# Patient Record
Sex: FEMALE | Race: WHITE
[De-identification: names, ages, dates, MRNs, and addresses within clinical notes are randomized per-mention and may not be internally consistent; named-entity substitution may affect disease eponyms.]

---

## 2018-07-27 ENCOUNTER — APPOINTMENT (OUTPATIENT)
Dept: ORTHOPEDIC SURGERY | Facility: CLINIC | Age: 38
End: 2018-07-27
Payer: MEDICAID

## 2018-07-27 VITALS
SYSTOLIC BLOOD PRESSURE: 126 MMHG | HEIGHT: 67 IN | DIASTOLIC BLOOD PRESSURE: 80 MMHG | BODY MASS INDEX: 25.9 KG/M2 | WEIGHT: 165 LBS | HEART RATE: 77 BPM

## 2018-07-27 DIAGNOSIS — Z86.69 PERSONAL HISTORY OF OTHER DISEASES OF THE NERVOUS SYSTEM AND SENSE ORGANS: ICD-10-CM

## 2018-07-27 DIAGNOSIS — Z78.9 OTHER SPECIFIED HEALTH STATUS: ICD-10-CM

## 2018-07-27 DIAGNOSIS — Z87.39 PERSONAL HISTORY OF OTHER DISEASES OF THE MUSCULOSKELETAL SYSTEM AND CONNECTIVE TISSUE: ICD-10-CM

## 2018-07-27 DIAGNOSIS — Z82.61 FAMILY HISTORY OF ARTHRITIS: ICD-10-CM

## 2018-07-27 DIAGNOSIS — Z80.9 FAMILY HISTORY OF MALIGNANT NEOPLASM, UNSPECIFIED: ICD-10-CM

## 2018-07-27 DIAGNOSIS — M17.11 UNILATERAL PRIMARY OSTEOARTHRITIS, RIGHT KNEE: ICD-10-CM

## 2018-07-27 DIAGNOSIS — M22.2X1 PATELLOFEMORAL DISORDERS, RIGHT KNEE: ICD-10-CM

## 2018-07-27 PROCEDURE — 99204 OFFICE O/P NEW MOD 45 MIN: CPT | Mod: 25

## 2018-07-27 PROCEDURE — 20610 DRAIN/INJ JOINT/BURSA W/O US: CPT | Mod: RT

## 2018-07-27 RX ORDER — IRON/IRON ASP GLY/FA/MV-MIN 38 125-25-1MG
TABLET ORAL
Refills: 0 | Status: ACTIVE | COMMUNITY

## 2018-07-27 RX ORDER — TURMERIC ROOT EXTRACT 500 MG
TABLET ORAL
Refills: 0 | Status: ACTIVE | COMMUNITY

## 2018-07-27 RX ORDER — DIMETHYL FUMARATE 120-240 MG
KIT ORAL
Refills: 0 | Status: ACTIVE | COMMUNITY

## 2018-07-27 RX ORDER — CHROMIUM 200 MCG
TABLET ORAL
Refills: 0 | Status: ACTIVE | COMMUNITY

## 2018-11-29 ENCOUNTER — INPATIENT (INPATIENT)
Facility: HOSPITAL | Age: 38
LOS: 1 days | Discharge: HOME | End: 2018-12-01
Attending: HOSPITALIST | Admitting: HOSPITALIST
Payer: MEDICARE

## 2018-11-29 VITALS
TEMPERATURE: 98 F | HEIGHT: 66 IN | SYSTOLIC BLOOD PRESSURE: 150 MMHG | RESPIRATION RATE: 22 BRPM | HEART RATE: 99 BPM | OXYGEN SATURATION: 100 % | DIASTOLIC BLOOD PRESSURE: 68 MMHG | WEIGHT: 160.06 LBS

## 2018-11-29 LAB
ALBUMIN SERPL ELPH-MCNC: 4.6 G/DL — SIGNIFICANT CHANGE UP (ref 3.3–5)
ALP SERPL-CCNC: 60 U/L — SIGNIFICANT CHANGE UP (ref 40–120)
ALT FLD-CCNC: 24 U/L — SIGNIFICANT CHANGE UP (ref 12–78)
ANION GAP SERPL CALC-SCNC: 12 MMOL/L — SIGNIFICANT CHANGE UP (ref 5–17)
APTT BLD: 31.1 SEC — SIGNIFICANT CHANGE UP (ref 27.5–36.3)
AST SERPL-CCNC: 14 U/L — LOW (ref 15–37)
BASOPHILS # BLD AUTO: 0.06 K/UL — SIGNIFICANT CHANGE UP (ref 0–0.2)
BASOPHILS NFR BLD AUTO: 0.8 % — SIGNIFICANT CHANGE UP (ref 0–2)
BILIRUB SERPL-MCNC: 0.5 MG/DL — SIGNIFICANT CHANGE UP (ref 0.2–1.2)
BUN SERPL-MCNC: 17 MG/DL — SIGNIFICANT CHANGE UP (ref 7–23)
CALCIUM SERPL-MCNC: 9.5 MG/DL — SIGNIFICANT CHANGE UP (ref 8.5–10.1)
CHLORIDE SERPL-SCNC: 108 MMOL/L — SIGNIFICANT CHANGE UP (ref 96–108)
CO2 SERPL-SCNC: 20 MMOL/L — LOW (ref 22–31)
CREAT SERPL-MCNC: 0.69 MG/DL — SIGNIFICANT CHANGE UP (ref 0.5–1.3)
EOSINOPHIL # BLD AUTO: 0.06 K/UL — SIGNIFICANT CHANGE UP (ref 0–0.5)
EOSINOPHIL NFR BLD AUTO: 0.8 % — SIGNIFICANT CHANGE UP (ref 0–6)
GLUCOSE SERPL-MCNC: 102 MG/DL — HIGH (ref 70–99)
HCG SERPL-ACNC: <1 MIU/ML — SIGNIFICANT CHANGE UP
HCT VFR BLD CALC: 30.2 % — LOW (ref 34.5–45)
HGB BLD-MCNC: 9.9 G/DL — LOW (ref 11.5–15.5)
IMM GRANULOCYTES NFR BLD AUTO: 0.1 % — SIGNIFICANT CHANGE UP (ref 0–1.5)
INR BLD: 1.02 RATIO — SIGNIFICANT CHANGE UP (ref 0.88–1.16)
LYMPHOCYTES # BLD AUTO: 2.21 K/UL — SIGNIFICANT CHANGE UP (ref 1–3.3)
LYMPHOCYTES # BLD AUTO: 28 % — SIGNIFICANT CHANGE UP (ref 13–44)
MCHC RBC-ENTMCNC: 20 PG — LOW (ref 27–34)
MCHC RBC-ENTMCNC: 32.8 GM/DL — SIGNIFICANT CHANGE UP (ref 32–36)
MCV RBC AUTO: 61 FL — LOW (ref 80–100)
MONOCYTES # BLD AUTO: 0.5 K/UL — SIGNIFICANT CHANGE UP (ref 0–0.9)
MONOCYTES NFR BLD AUTO: 6.3 % — SIGNIFICANT CHANGE UP (ref 2–14)
NEUTROPHILS # BLD AUTO: 5.06 K/UL — SIGNIFICANT CHANGE UP (ref 1.8–7.4)
NEUTROPHILS NFR BLD AUTO: 64 % — SIGNIFICANT CHANGE UP (ref 43–77)
NRBC # BLD: 0 /100 WBCS — SIGNIFICANT CHANGE UP (ref 0–0)
PLATELET # BLD AUTO: 232 K/UL — SIGNIFICANT CHANGE UP (ref 150–400)
POTASSIUM SERPL-MCNC: 3.6 MMOL/L — SIGNIFICANT CHANGE UP (ref 3.5–5.3)
POTASSIUM SERPL-SCNC: 3.6 MMOL/L — SIGNIFICANT CHANGE UP (ref 3.5–5.3)
PROT SERPL-MCNC: 7.4 GM/DL — SIGNIFICANT CHANGE UP (ref 6–8.3)
PROTHROM AB SERPL-ACNC: 11.3 SEC — SIGNIFICANT CHANGE UP (ref 10–12.9)
RBC # BLD: 4.95 M/UL — SIGNIFICANT CHANGE UP (ref 3.8–5.2)
RBC # FLD: 15.3 % — HIGH (ref 10.3–14.5)
SODIUM SERPL-SCNC: 140 MMOL/L — SIGNIFICANT CHANGE UP (ref 135–145)
WBC # BLD: 7.9 K/UL — SIGNIFICANT CHANGE UP (ref 3.8–10.5)
WBC # FLD AUTO: 7.9 K/UL — SIGNIFICANT CHANGE UP (ref 3.8–10.5)

## 2018-11-29 PROCEDURE — 70450 CT HEAD/BRAIN W/O DYE: CPT | Mod: 26

## 2018-11-29 PROCEDURE — 71045 X-RAY EXAM CHEST 1 VIEW: CPT | Mod: 26

## 2018-11-29 PROCEDURE — 93010 ELECTROCARDIOGRAM REPORT: CPT

## 2018-11-29 PROCEDURE — 99285 EMERGENCY DEPT VISIT HI MDM: CPT

## 2018-11-29 RX ORDER — DEXAMETHASONE 0.5 MG/5ML
10 ELIXIR ORAL ONCE
Qty: 0 | Refills: 0 | Status: COMPLETED | OUTPATIENT
Start: 2018-11-29 | End: 2018-11-29

## 2018-11-29 RX ORDER — ASPIRIN/CALCIUM CARB/MAGNESIUM 324 MG
325 TABLET ORAL ONCE
Qty: 0 | Refills: 0 | Status: COMPLETED | OUTPATIENT
Start: 2018-11-29 | End: 2018-11-29

## 2018-11-29 RX ORDER — SODIUM CHLORIDE 9 MG/ML
1000 INJECTION INTRAMUSCULAR; INTRAVENOUS; SUBCUTANEOUS ONCE
Qty: 0 | Refills: 0 | Status: COMPLETED | OUTPATIENT
Start: 2018-11-29 | End: 2018-11-29

## 2018-11-29 RX ORDER — DEXAMETHASONE 0.5 MG/5ML
10 ELIXIR ORAL ONCE
Qty: 0 | Refills: 0 | Status: DISCONTINUED | OUTPATIENT
Start: 2018-11-29 | End: 2018-11-29

## 2018-11-29 RX ADMIN — Medication 102 MILLIGRAM(S): at 19:45

## 2018-11-29 RX ADMIN — SODIUM CHLORIDE 1000 MILLILITER(S): 9 INJECTION INTRAMUSCULAR; INTRAVENOUS; SUBCUTANEOUS at 19:04

## 2018-11-29 RX ADMIN — SODIUM CHLORIDE 1000 MILLILITER(S): 9 INJECTION INTRAMUSCULAR; INTRAVENOUS; SUBCUTANEOUS at 21:00

## 2018-11-29 RX ADMIN — Medication 325 MILLIGRAM(S): at 21:15

## 2018-11-29 RX ADMIN — Medication 10 MILLIGRAM(S): at 20:15

## 2018-11-29 NOTE — H&P ADULT - PMH
Anemia, unspecified type    Irritable bowel syndrome with both constipation and diarrhea    Kidney stones    Migraines    MS (multiple sclerosis) Anemia in other chronic diseases classified elsewhere  beta-thal minor  Beta thalassemia minor    Irritable bowel syndrome with both constipation and diarrhea    Kidney stones    Migraines    MS (multiple sclerosis)

## 2018-11-29 NOTE — ED ADULT TRIAGE NOTE - CHIEF COMPLAINT QUOTE
acute onset of dizziness at 430pm, numbness down 1 arm. hx of MS. evaluated by dr oleary at triage and pt is not a code stroke

## 2018-11-29 NOTE — H&P ADULT - NSHPSOCIALHISTORY_GEN_ALL_CORE
Lives at home with  and two daughters. She is on disability and is unemployed. Never cigarette smoker, no vaping. Occasionally smokes marijuana. Rarely drinks alcohol.

## 2018-11-29 NOTE — ED PROVIDER NOTE - NS_ ATTENDINGSCRIBEDETAILS _ED_A_ED_FT
I, Jori Mark MD,  performed the initial face to face bedside interview with this patient regarding history of present illness, review of symptoms and relevant past medical, social and family history.  I completed an independent physical examination.    The history, relevant review of systems, past medical and surgical history, medical decision making, and physical examination was documented by the scribe in my presence and I attest to the accuracy of the documentation.

## 2018-11-29 NOTE — ED ADULT NURSE NOTE - OBJECTIVE STATEMENT
pt presents to ED tearful/frightened 2* numbness to left arm/leg. Pt dx w/ MS 3yrs prior. Pt has not had symptoms since then.

## 2018-11-29 NOTE — H&P ADULT - NSHPPHYSICALEXAM_GEN_ALL_CORE
Vitals  T(F): 98 (11-29-18 @ 22:04), Max: 98 (11-29-18 @ 18:36)  HR: 70 (11-29-18 @ 22:04) (70 - 99)  BP: 115/55 (11-29-18 @ 22:04) (115/55 - 150/68)  RR: 12 (11-29-18 @ 22:04) (12 - 22)  SpO2: 100% (11-29-18 @ 22:04) (100% - 100%)    Physical Exam   Gen: NAD, comfortable, well nourished, good hygiene  HENT: atraumatic, hearing intact, no gross abnormalities to ears or nose, no rhinorrhea or nasal polyps, mucous membranes moist without lesions or discharge, neck supple without masses  CV: RRR, nl s1/s2, no M/R/G, chest pressure reproducible with touch  Pulm: nl respiratory effort, CTAB, no wheezes/crackles/rhonchi  Back: mild tenderness in low back bilaterally, no scoliosis, lordosis, or kyphosis  Abd: normoactive bowel sounds in all 4 quadrants, soft, nontender, nondistended, no rebound, no guarding, no masses  Extremities: no pedal edema, pedal pulses palpable   Skin: nl warm and dry, no wounds   Neuro: A&Ox3, answering questions appropriately, PERRL, EOMI but sluggish, face symmetric, sensation equal bilaterally in face, tongue midline, no dysarthria, 4/5 strength in the R upper and lower extremities, 3/5 strength in L upper and lower extremities, sensation to light touch and proprioception intact in upper and lower extremities bilaterally, 2+ biceps reflex in R arm, L arm biceps reflex unable to do due to IV placement, b2+ patellar reflexes bilaterally, 2+ achilles reflex in right foot and 1+ achilles reflex in left foot, nl finger to nose, and nl heel to shin   Pysch: no depression, no SI, no HI Vitals  T(F): 98 (11-29-18 @ 22:04), Max: 98 (11-29-18 @ 18:36)  HR: 70 (11-29-18 @ 22:04) (70 - 99)  BP: 115/55 (11-29-18 @ 22:04) (115/55 - 150/68)  RR: 12 (11-29-18 @ 22:04) (12 - 22)  SpO2: 100% (11-29-18 @ 22:04) (100% - 100%)    Physical Exam   Gen: NAD, comfortable, well nourished, good hygiene  HENT: atraumatic, hearing intact, no gross abnormalities to ears or nose, no rhinorrhea or nasal polyps, mucous membranes moist without lesions or discharge, neck supple without masses  CV: RRR, nl s1/s2, no M/R/G, chest pressure reproducible with touch  Pulm: nl respiratory effort, CTAB, no wheezes/crackles/rhonchi  Back: mild tenderness in low back bilaterally, no scoliosis, lordosis, or kyphosis  Abd: normoactive bowel sounds in all 4 quadrants, soft, nontender, nondistended, no rebound, no guarding, no masses  Extremities: no pedal edema, pedal pulses palpable   Skin: nl warm and dry, no wounds   Neuro: A&Ox3, answering questions appropriately, PERRL, EOMI but sluggish, smile assymetric with left side slightly lower than right, sensation equal bilaterally in face, tongue midline, no dysarthria, 4/5 strength in the R upper and lower extremities, 3/5 strength in L upper and lower extremities, sensation to light touch and proprioception intact in upper and lower extremities bilaterally but sensation to touch greater in right extremties than left extremities, 2+ biceps reflex in R arm, L arm biceps reflex unable to do due to IV placement, b2+ patellar reflexes bilaterally, 2+ achilles reflex in right foot and 1+ achilles reflex in left foot, nl finger to nose, and nl heel to shin   Pysch: no depression, no SI, no HI

## 2018-11-29 NOTE — ED ADULT NURSE NOTE - NSIMPLEMENTINTERV_GEN_ALL_ED
Implemented All Fall Risk Interventions:  [place] to call system. Call bell, personal items and telephone within reach. Instruct patient to call for assistance. Room bathroom lighting operational. Non-slip footwear when patient is off stretcher. Physically safe environment: no spills, clutter or unnecessary equipment. Stretcher in lowest position, wheels locked, appropriate side rails in place. Provide visual cue, wrist band, yellow gown, etc. Monitor gait and stability. Monitor for mental status changes and reorient to person, place, and time. Review medications for side effects contributing to fall risk. Reinforce activity limits and safety measures with patient and family.

## 2018-11-29 NOTE — ED PROVIDER NOTE - OBJECTIVE STATEMENT
37 y/o female with a PMHx of MS presents to the ED c/o hours worth of numbness to her left side. +dizziness. Pt states that she had similar symptoms years ago when she was first diagnosed with MS. States that right now she is still dizzy an is having worsening numbness to her left side. Neurologist: Dr. Sethi. 39 y/o female with a PMHx of MS presents to the ED c/o hours worth of numbness to her left side. Pt states this started at an unknown time earlier this morning. Pt states she was ignoring it but then it got worse and called 911. . +dizziness. Pt states that she had similar symptoms years ago when she was first diagnosed with MS. States that right now she is still dizzy an is having worsening numbness to her left side. Neurologist: Dr. Sethi.

## 2018-11-29 NOTE — ED PROVIDER NOTE - NEUROLOGICAL, MLM
Alert and oriented, no focal deficits, no motor or sensory deficits. +Neurovascularly intact distally, no focal neuro deficits

## 2018-11-29 NOTE — ED ADULT NURSE REASSESSMENT NOTE - NS ED NURSE REASSESS COMMENT FT1
change of shift report received from JORDI Kimble. pt vitals as charted. pt still has weakness to the left arm and leg. pt is A&Ox3, cooperative with exam. pt states that she is feeling better to go home, however has been notified about pending admission to hospital and f/u MRI scheduled for tomorrow. pt is agreeable. will continue to monitor.

## 2018-11-29 NOTE — H&P ADULT - HISTORY OF PRESENT ILLNESS
Pt is a 37 yo F w/ PMHx of MS, IBS, migraines, and anemia presenting with left arm and leg numbness and weakness. Pt was in her usual state of health until yesterday when she started feeling dizzy which got better, but today at 4PM as she was putting up michael decorations started feeling dizzy again. Pt reports visual blurriness, seeing spots, double vision, room spinning, and not being able to open her eyes. She felt as if her heart was pounding and she couldn't breathe. She reports feeling a chest pressure lower retrosternal, but no pain. She denies diaphoresis, nausea, and vomiting. She went to urgent care and while she was there, she started feeling her left arm feel numb and she couldn't move it. She was sent to the ED and the numbness and paralysis spread to her left leg. She denies any slurring of speech, facial asymmetry, syncope, LOC. This is similar to a previous MS flare that she had 3 years ago when she was diagnosed. At that time, it started with dizziness that continued for 2.5 days followed by left arm numbness and weakness. Of note for the last few days, she has been having cold sweats, but no fevers. She denies cough, wheeze, runny nose. 2 weeks ago she had abdominal pain with nausea and vomiting that resolved.     In the ED, she received decadron 10 mg and her weakness has improved. Her dizziness and sob has resolved. However now she feels pain in her left arm and leg as if someone punched them. She currently has a headache across the top of her head that is throbbing, but mild compared to her migraines.     Review of Symptoms  Gen: no fevers, weight loss, fatigue  Visual: see HPI for positives   Cardiovascular: see HPI, no orthopnea, no leg swelling  Respiratory: see HPI, no exertional dyspnea, no cough, no rhinorrhea, no nasal congestion  GI: no difficulty swallowing, no nausea, no vomiting, no abdominal pain, no diarrhea, no constipation, no melana  : no dysuria, no increased freq, no hematuria, no malodorous urine  Derm: no wounds, no rashes  Heme: no easy bleeding or bruising  MSK: no joint pain, no joint swelling or redness, no extremity pain   Neuro: see HPI, no memory loss  Psych: no depression, no anxiety, no SI Pt is a 39 yo F w/ PMHx of MS, IBS, migraines, and anemia presenting with left arm and leg numbness and weakness. Pt was in her usual state of health until yesterday when she started feeling dizzy which got better, but today at 4PM as she was putting up michael decorations started feeling dizzy again. Pt reports visual blurriness, seeing spots, double vision, room spinning, and not being able to open her eyes. She felt as if her heart was pounding and she couldn't breathe. She reports feeling a chest pressure lower retrosternal, but no pain. She denies diaphoresis, nausea, and vomiting. She went to urgent care and while she was there, she started feeling her left arm feel numb and she couldn't move it. She was sent to the ED and the numbness and paralysis spread to her left leg. She denies any slurring of speech, facial asymmetry, syncope, LOC. This is similar to a previous MS flare that she had 3 years ago when she was diagnosed. At that time, it started with dizziness that continued for 2.5 days followed by left arm numbness and weakness. She has no residual deficits from her last flare. Of note for the last few days, she has been having cold sweats, but no fevers. She denies cough, wheeze, runny nose. 2 weeks ago she had abdominal pain with nausea and vomiting that resolved.     In the ED, she received decadron 10 mg and her weakness has improved. Her dizziness and sob has resolved. However now she feels pain in her left arm and leg as if someone punched them. She currently has a headache across the top of her head that is throbbing, but mild compared to her migraines.     Review of Symptoms  Gen: no fevers, weight loss, fatigue  Visual: see HPI for positives   Cardiovascular: see HPI, no orthopnea, no leg swelling  Respiratory: see HPI, no exertional dyspnea, no cough, no rhinorrhea, no nasal congestion  GI: no difficulty swallowing, no nausea, no vomiting, no abdominal pain, no diarrhea, no constipation, no melana  : no dysuria, no increased freq, no hematuria, no malodorous urine  Derm: no wounds, no rashes  Heme: no easy bleeding or bruising  MSK: no joint pain, no joint swelling or redness, no extremity pain   Neuro: see HPI, no memory loss  Psych: no depression, no anxiety, no SI

## 2018-11-29 NOTE — H&P ADULT - ASSESSMENT
39 yo F w/ PMHx of MS, IBS, and anemia presenting with left arm and leg weakness and numbness likely an MS flare.     #MS flare vs CVA  - most likely MS flare as is similar to her previous MS flare with improvement of symptoms after steroids  - admit to med   - s/p dexamethasone 10 mg IV  - solumedrol 500 mg IV once daily   - neuro consult   - MRI brain  - regular diet    #Anemia  - microcytic anemia possibly iron deficiency or thalessemia given pt's mediterranean ancestry   - iron studies     #DVT ppx  - SCDs  IMPROVE VTE Individual Risk Assessment  RISK                                                                Points  [  ] Previous VTE                                                  3  [  ] Thrombophilia                                               2  [X] Lower limb paralysis                                      2        (unable to hold up >15 seconds)    [  ] Current Cancer                                              2         (within 6 months)  [  ] Immobilization > 24 hrs                                1  [  ] ICU/CCU stay > 24 hours                              1  [  ] Age > 60                                                      1  IMPROVE VTE Score 2 37 yo F w/ PMHx of MS, IBS, and anemia presenting with left arm and leg weakness and numbness likely an MS flare.     #MS flare vs CVA  - most likely MS flare as is similar to her previous MS flare with improvement of symptoms after steroids  - admit to med   - s/p dexamethasone 10 mg IV  - solumedrol 1000 mg IV once daily   - neuro consult   - MRI brain w/ & w/o contrast  - lipid panel  - A1c  - regular diet    #Beta-thalessemia minor anemia  - per pt, baseline hgb is in 9s   - continue to monitor     #DVT ppx  - SCDs  IMPROVE VTE Individual Risk Assessment  RISK                                                                Points  [  ] Previous VTE                                                  3  [  ] Thrombophilia                                               2  [X] Lower limb paralysis                                      2        (unable to hold up >15 seconds)    [  ] Current Cancer                                              2         (within 6 months)  [  ] Immobilization > 24 hrs                                1  [  ] ICU/CCU stay > 24 hours                              1  [  ] Age > 60                                                      1  IMPROVE VTE Score 2

## 2018-11-29 NOTE — H&P ADULT - PSH
No significant past surgical history Fixation hardware in lower extremity    H/O lumpectomy    H/O ovarian cystectomy    History of 2  sections    History of bunionectomy

## 2018-11-29 NOTE — H&P ADULT - ATTENDING COMMENTS
Patient seen and examined with resident physician Bebe Sifuentes. I personally had a face-to-face encounter with the patient, examined the patient myself and reviewed the plan of care with the patient and Resident Bebe Sifuentes. I agree with the assessment and plan of Resident Bebe Sifuentes as stated and discussed.    This is a 38 y.o. female with PMH multiple sclerosis, IBS, migraines, beta thalassemia anemia presents with left arm and leg numbness and weakness.    #numbness and weakness likely due to MS flare  -admit to med surg  -pt reports improvement of symptoms after steroid injection  -solumedrol 1g iv daily  -neuro checks  -MRI brain w/wo  -neuro consult  -check lipid panel  -cont ASA  -cont home MS med, Tecfidera    #beta thalassemia anemia  -pt reports baseline around 9-10  -currently Hb 9.9, at baseline    #DVT ppx  -SCDs

## 2018-11-29 NOTE — H&P ADULT - NSHPLABSRESULTS_GEN_ALL_CORE
CBC Full  -  ( 29 Nov 2018 18:57 )  WBC Count : 7.90 K/uL  Hemoglobin : 9.9 g/dL  Hematocrit : 30.2 %  Platelet Count - Automated : 232 K/uL  Mean Cell Volume : 61.0 fl  Mean Cell Hemoglobin : 20.0 pg  Mean Cell Hemoglobin Concentration : 32.8 gm/dL  Auto Neutrophil # : 5.06 K/uL  Auto Lymphocyte # : 2.21 K/uL  Auto Monocyte # : 0.50 K/uL  Auto Eosinophil # : 0.06 K/uL  Auto Basophil # : 0.06 K/uL  Auto Neutrophil % : 64.0 %  Auto Lymphocyte % : 28.0 %  Auto Monocyte % : 6.3 %  Auto Eosinophil % : 0.8 %  Auto Basophil % : 0.8 %    11-29    140  |  108  |  17  ----------------------------<  102<H>  3.6   |  20<L>  |  0.69    Ca    9.5      29 Nov 2018 18:57    TPro  7.4  /  Alb  4.6  /  TBili  0.5  /  DBili  x   /  AST  14<L>  /  ALT  24  /  AlkPhos  60      POCT Blood Glucose.: 123 mg/dL (29 Nov 2018 18:37)    PT/INR - ( 29 Nov 2018 18:57 )   PT: 11.3 sec;   INR: 1.02 ratio    PTT - ( 29 Nov 2018 18:57 )  PTT:31.1 sec    HCG Quantitative, Serum: <1 (11.29.18 @ 18:57)    < from: CT Head No Cont (11.29.18 @ 19:27) >  FINDINGS:  Nonspecific subtle hypodensity in anterior basal ganglia on image 9, series 2.  There is no evidence of mass, mass effect, midline shift or extra-axial fluid collection. The lateral ventricles and cortical sulci are age-appropriate in size and configuration. The orbits, mastoid air cells and visualized paranasal sinuses are unremarkable. The calvarium is intact.    IMPRESSION:  No acute hemorrhage. Nonspecific subcentimeter hypodensity in the anterior right basal ganglia. Consider MRI for further evaluation. CBC Full  -  ( 29 Nov 2018 18:57 )  WBC Count : 7.90 K/uL  Hemoglobin : 9.9 g/dL  Hematocrit : 30.2 %  Platelet Count - Automated : 232 K/uL  Mean Cell Volume : 61.0 fl  Mean Cell Hemoglobin : 20.0 pg  Mean Cell Hemoglobin Concentration : 32.8 gm/dL  Auto Neutrophil # : 5.06 K/uL  Auto Lymphocyte # : 2.21 K/uL  Auto Monocyte # : 0.50 K/uL  Auto Eosinophil # : 0.06 K/uL  Auto Basophil # : 0.06 K/uL  Auto Neutrophil % : 64.0 %  Auto Lymphocyte % : 28.0 %  Auto Monocyte % : 6.3 %  Auto Eosinophil % : 0.8 %  Auto Basophil % : 0.8 %    11-29    140  |  108  |  17  ----------------------------<  102<H>  3.6   |  20<L>  |  0.69    Ca    9.5      29 Nov 2018 18:57    TPro  7.4  /  Alb  4.6  /  TBili  0.5  /  DBili  x   /  AST  14<L>  /  ALT  24  /  AlkPhos  60      POCT Blood Glucose.: 123 mg/dL (29 Nov 2018 18:37)    PT/INR - ( 29 Nov 2018 18:57 )   PT: 11.3 sec;   INR: 1.02 ratio    PTT - ( 29 Nov 2018 18:57 )  PTT:31.1 sec    HCG Quantitative, Serum: <1 (11.29.18 @ 18:57)    EKG: NSR    < from: CT Head No Cont (11.29.18 @ 19:27) >  FINDINGS:  Nonspecific subtle hypodensity in anterior basal ganglia on image 9, series 2.  There is no evidence of mass, mass effect, midline shift or extra-axial fluid collection. The lateral ventricles and cortical sulci are age-appropriate in size and configuration. The orbits, mastoid air cells and visualized paranasal sinuses are unremarkable. The calvarium is intact.    IMPRESSION:  No acute hemorrhage. Nonspecific subcentimeter hypodensity in the anterior right basal ganglia. Consider MRI for further evaluation.

## 2018-11-29 NOTE — H&P ADULT - FAMILY HISTORY
Grandparent  Still living? Unknown  Family history of colon cancer, Age at diagnosis: Age Unknown  Family history of bladder cancer, Age at diagnosis: Age Unknown

## 2018-11-30 DIAGNOSIS — Z98.890 OTHER SPECIFIED POSTPROCEDURAL STATES: Chronic | ICD-10-CM

## 2018-11-30 DIAGNOSIS — Z98.891 HISTORY OF UTERINE SCAR FROM PREVIOUS SURGERY: Chronic | ICD-10-CM

## 2018-11-30 DIAGNOSIS — Z96.7 PRESENCE OF OTHER BONE AND TENDON IMPLANTS: Chronic | ICD-10-CM

## 2018-11-30 LAB
ALBUMIN SERPL ELPH-MCNC: 3.6 G/DL — SIGNIFICANT CHANGE UP (ref 3.3–5)
ALP SERPL-CCNC: 45 U/L — SIGNIFICANT CHANGE UP (ref 40–120)
ALT FLD-CCNC: 22 U/L — SIGNIFICANT CHANGE UP (ref 12–78)
ANION GAP SERPL CALC-SCNC: 9 MMOL/L — SIGNIFICANT CHANGE UP (ref 5–17)
ANISOCYTOSIS BLD QL: SLIGHT — SIGNIFICANT CHANGE UP
AST SERPL-CCNC: 10 U/L — LOW (ref 15–37)
BASOPHILS # BLD AUTO: 0.02 K/UL — SIGNIFICANT CHANGE UP (ref 0–0.2)
BASOPHILS NFR BLD AUTO: 0.3 % — SIGNIFICANT CHANGE UP (ref 0–2)
BILIRUB SERPL-MCNC: 0.6 MG/DL — SIGNIFICANT CHANGE UP (ref 0.2–1.2)
BUN SERPL-MCNC: 11 MG/DL — SIGNIFICANT CHANGE UP (ref 7–23)
CALCIUM SERPL-MCNC: 8.2 MG/DL — LOW (ref 8.5–10.1)
CHLORIDE SERPL-SCNC: 110 MMOL/L — HIGH (ref 96–108)
CHOLEST SERPL-MCNC: 153 MG/DL — SIGNIFICANT CHANGE UP (ref 10–199)
CO2 SERPL-SCNC: 23 MMOL/L — SIGNIFICANT CHANGE UP (ref 22–31)
CREAT SERPL-MCNC: 0.49 MG/DL — LOW (ref 0.5–1.3)
ELLIPTOCYTES BLD QL SMEAR: SLIGHT — SIGNIFICANT CHANGE UP
EOSINOPHIL # BLD AUTO: 0 K/UL — SIGNIFICANT CHANGE UP (ref 0–0.5)
EOSINOPHIL NFR BLD AUTO: 0 % — SIGNIFICANT CHANGE UP (ref 0–6)
GLUCOSE SERPL-MCNC: 145 MG/DL — HIGH (ref 70–99)
HBA1C BLD-MCNC: 5 % — SIGNIFICANT CHANGE UP (ref 4–5.6)
HCT VFR BLD CALC: 29.5 % — LOW (ref 34.5–45)
HDLC SERPL-MCNC: 90 MG/DL — SIGNIFICANT CHANGE UP
HGB BLD-MCNC: 9.4 G/DL — LOW (ref 11.5–15.5)
HYPOCHROMIA BLD QL: SLIGHT — SIGNIFICANT CHANGE UP
IMM GRANULOCYTES NFR BLD AUTO: 0.1 % — SIGNIFICANT CHANGE UP (ref 0–1.5)
LIPID PNL WITH DIRECT LDL SERPL: 59 MG/DL — SIGNIFICANT CHANGE UP
LYMPHOCYTES # BLD AUTO: 1.14 K/UL — SIGNIFICANT CHANGE UP (ref 1–3.3)
LYMPHOCYTES # BLD AUTO: 17 % — SIGNIFICANT CHANGE UP (ref 13–44)
MACROCYTES BLD QL: SLIGHT — SIGNIFICANT CHANGE UP
MANUAL SMEAR VERIFICATION: SIGNIFICANT CHANGE UP
MCHC RBC-ENTMCNC: 19.7 PG — LOW (ref 27–34)
MCHC RBC-ENTMCNC: 31.9 GM/DL — LOW (ref 32–36)
MCV RBC AUTO: 61.8 FL — LOW (ref 80–100)
MICROCYTES BLD QL: SIGNIFICANT CHANGE UP
MONOCYTES # BLD AUTO: 0.3 K/UL — SIGNIFICANT CHANGE UP (ref 0–0.9)
MONOCYTES NFR BLD AUTO: 4.5 % — SIGNIFICANT CHANGE UP (ref 2–14)
NEUTROPHILS # BLD AUTO: 5.24 K/UL — SIGNIFICANT CHANGE UP (ref 1.8–7.4)
NEUTROPHILS NFR BLD AUTO: 78.1 % — HIGH (ref 43–77)
NRBC # BLD: 0 /100 WBCS — SIGNIFICANT CHANGE UP (ref 0–0)
PLAT MORPH BLD: NORMAL — SIGNIFICANT CHANGE UP
PLATELET # BLD AUTO: 209 K/UL — SIGNIFICANT CHANGE UP (ref 150–400)
POIKILOCYTOSIS BLD QL AUTO: SLIGHT — SIGNIFICANT CHANGE UP
POLYCHROMASIA BLD QL SMEAR: SLIGHT — SIGNIFICANT CHANGE UP
POTASSIUM SERPL-MCNC: 4.1 MMOL/L — SIGNIFICANT CHANGE UP (ref 3.5–5.3)
POTASSIUM SERPL-SCNC: 4.1 MMOL/L — SIGNIFICANT CHANGE UP (ref 3.5–5.3)
PROT SERPL-MCNC: 6.3 GM/DL — SIGNIFICANT CHANGE UP (ref 6–8.3)
RBC # BLD: 4.77 M/UL — SIGNIFICANT CHANGE UP (ref 3.8–5.2)
RBC # FLD: 15.7 % — HIGH (ref 10.3–14.5)
RBC BLD AUTO: ABNORMAL
SCHISTOCYTES BLD QL AUTO: SLIGHT — SIGNIFICANT CHANGE UP
SODIUM SERPL-SCNC: 142 MMOL/L — SIGNIFICANT CHANGE UP (ref 135–145)
TOTAL CHOLESTEROL/HDL RATIO MEASUREMENT: 1.7 RATIO — LOW (ref 3.3–7.1)
TRIGL SERPL-MCNC: 18 MG/DL — SIGNIFICANT CHANGE UP (ref 10–149)
WBC # BLD: 6.71 K/UL — SIGNIFICANT CHANGE UP (ref 3.8–10.5)
WBC # FLD AUTO: 6.71 K/UL — SIGNIFICANT CHANGE UP (ref 3.8–10.5)

## 2018-11-30 PROCEDURE — 99223 1ST HOSP IP/OBS HIGH 75: CPT

## 2018-11-30 PROCEDURE — 70553 MRI BRAIN STEM W/O & W/DYE: CPT | Mod: 26

## 2018-11-30 PROCEDURE — 72156 MRI NECK SPINE W/O & W/DYE: CPT | Mod: 26

## 2018-11-30 RX ORDER — ACETAMINOPHEN 500 MG
650 TABLET ORAL EVERY 6 HOURS
Qty: 0 | Refills: 0 | Status: DISCONTINUED | OUTPATIENT
Start: 2018-11-30 | End: 2018-12-01

## 2018-11-30 RX ORDER — SENNA PLUS 8.6 MG/1
2 TABLET ORAL ONCE
Qty: 0 | Refills: 0 | Status: COMPLETED | OUTPATIENT
Start: 2018-11-30 | End: 2018-11-30

## 2018-11-30 RX ORDER — PANTOPRAZOLE SODIUM 20 MG/1
40 TABLET, DELAYED RELEASE ORAL DAILY
Qty: 0 | Refills: 0 | Status: DISCONTINUED | OUTPATIENT
Start: 2018-11-30 | End: 2018-12-01

## 2018-11-30 RX ORDER — INFLUENZA VIRUS VACCINE 15; 15; 15; 15 UG/.5ML; UG/.5ML; UG/.5ML; UG/.5ML
0.5 SUSPENSION INTRAMUSCULAR ONCE
Qty: 0 | Refills: 0 | Status: DISCONTINUED | OUTPATIENT
Start: 2018-11-30 | End: 2018-12-01

## 2018-11-30 RX ORDER — DIMETHYL FUMARATE 240 MG/1
240 CAPSULE ORAL
Qty: 0 | Refills: 0 | Status: DISCONTINUED | OUTPATIENT
Start: 2018-11-30 | End: 2018-12-01

## 2018-11-30 RX ORDER — ASPIRIN/CALCIUM CARB/MAGNESIUM 324 MG
81 TABLET ORAL DAILY
Qty: 0 | Refills: 0 | Status: DISCONTINUED | OUTPATIENT
Start: 2018-11-30 | End: 2018-12-01

## 2018-11-30 RX ADMIN — DIMETHYL FUMARATE 240 MILLIGRAM(S): 240 CAPSULE ORAL at 21:34

## 2018-11-30 RX ADMIN — Medication 81 MILLIGRAM(S): at 12:15

## 2018-11-30 RX ADMIN — Medication 29 MILLIGRAM(S): at 06:14

## 2018-11-30 RX ADMIN — SENNA PLUS 2 TABLET(S): 8.6 TABLET ORAL at 21:35

## 2018-11-30 RX ADMIN — PANTOPRAZOLE SODIUM 40 MILLIGRAM(S): 20 TABLET, DELAYED RELEASE ORAL at 12:15

## 2018-11-30 NOTE — CONSULT NOTE ADULT - SUBJECTIVE AND OBJECTIVE BOX
Patient is a 38y old  Female who presents with a chief complaint of left arm and leg numbness & weakness sudden onset yesterday      HPI:  Pt is a 39 yo F w/ PMHx of MS, IBS, migraines, and anemia presenting with left arm and leg numbness and weakness. Pt was in her usual state of health until yesterday when she started feeling dizzy which got better, but today at 4PM as she was putting up michael decorations started feeling dizzy again. Pt reports visual blurriness, seeing spots, double vision, room spinning, and not being able to open her eyes. She felt as if her heart was pounding and she couldn't breathe. She reports feeling a chest pressure lower retrosternal, but no pain. She denies diaphoresis, nausea, and vomiting. She went to urgent care and while she was there, she started feeling her left arm feel numb and she couldn't move it. She was sent to the ED and the numbness and paralysis spread to her left leg. She denies any slurring of speech, facial asymmetry, syncope, LOC. This is similar to a previous MS flare that she had 3 years ago when she was diagnosed. At that time, it started with dizziness that continued for 2.5 days followed by left arm numbness and weakness. She has no residual deficits from her last flare. Of note for the last few days, she has been having cold sweats, but no fevers. She denies cough, wheeze, runny nose. 2 weeks ago she had abdominal pain with nausea and vomiting that resolved. She had work up in [facility] and later under care of [facility]  and being followed with MRI scans at [facility]  . On Tecfidera 240 mg twice a day since dx.       In the ED, she received decadron 10 mg and her weakness has improved. Her dizziness and sob has resolved. However now she feels pain in her left arm and leg as if someone punched them. She currently has a headache across the top of her head that is throbbing, but mild compared to her migraines.     PAST MEDICAL & SURGICAL HISTORY:  Beta thalassemia minor  Anemia in other chronic diseases classified elsewhere: beta-thal minor  Migraines  Kidney stones  Irritable bowel syndrome with both constipation and diarrhea  MS (multiple sclerosis)  H/O ovarian cystectomy  H/O lumpectomy  Fixation hardware in lower extremity  History of shaynectomy  History of 2  sections      FAMILY HISTORY:  Family history of bladder cancer (Grandparent)  Family history of colon cancer (Grandparent)      Social Hx:  Nonsmoker, no drug or alcohol use    MEDICATIONS  (STANDING):  aspirin enteric coated 81 milliGRAM(s) Oral daily  influenza   Vaccine 0.5 milliLiter(s) IntraMuscular once  methylPREDNISolone sodium succinate IVPB 1000 milliGRAM(s) IV Intermittent daily       Allergies    Tysabri (Unknown)      ROS: Pertinent positives in HPI, all other ROS were reviewed and are negative.      Vital Signs Last 24 Hrs  T(C): 36.7 (2018 05:01), Max: 36.9 (2018 02:19)  T(F): 98 (2018 05:01), Max: 98.4 (2018 02:19)  HR: 74 (2018 05:01) (60 - 99)  BP: 122/53 (2018 05:01) (100/59 - 150/68)  BP(mean): 74 (2018 22:04) (74 - 74)  RR: 16 (2018 05:01) (12 - 22)  SpO2: 99% (2018 05:01) (99% - 100%)        Constitutional: awake and alert.  HEENT: PERRLA, EOMI,   Neck: Supple.  Respiratory: Breath sounds are clear bilaterally  Cardiovascular: S1 and S2, regular  rhythm  Gastrointestinal: soft, nontender  Extremities:  no edema  Vascular:  Carotid Bruit - no  Musculoskeletal: no joint swelling/tenderness, no abnormal movements  Skin: No rashes    Neurological exam:  HF: A x O x 3. Appropriately interactive, normal affect. Speech fluent, No Aphasia or paraphasic errors.    CN: MIGDALIA, EOMI, VFF, facial sensation normal, no NLFD, tongue midline, Palate moves equally, gag intact  Motor: Mild pronator drift on left UE with mild weakness Let side 4/5   Sens: Intact to light touch  with subjective hyperalgesia on left side    Reflexes: Brisk vilaterally slightly more on Left than right  downgoing toes  Rt equivocal on left  Coord:  No FNFA, dysmetria,  but slow on left   Gait/Balance:  Cannot test        Labs:       142  |  110<H>  |  11  ----------------------------<  145<H>  4.1   |  23  |  0.49<L>    Ca    8.2<L>      2018 06:41    TPro  6.3  /  Alb  3.6  /  TBili  0.6  /  DBili  x   /  AST  10<L>  /  ALT  22  /  AlkPhos  45   Chol 153 LDL 59 HDL 90 Trig 18   [id number] 5.0                          9.4    6.71  )-----------( 209      ( 2018 06:41 )             29.5       Radiology:  - CT Head:  < from: CT Head No Cont (18 @ 19:27) >    IMPRESSION:    No acute hemorrhage.  Nonspecific subcentimeter hypodensity in the anterior right basal   ganglia. Consider MRI for further evaluation.

## 2018-11-30 NOTE — PROGRESS NOTE ADULT - ASSESSMENT
37 yo F w/ PMHx of MS, IBS, migraines, and anemia presenting with left arm and leg numbness and weakness    #Multiple Sclerosis Flare  ~L sided weakness and numbness of arm and leg clinically improving  -Will rule out underlying UTI/ infectious process- f/u UCx  -MRI C-spine- T2 signal hyperintensity within the cord extending from C2 through C6; MR Brain- Multiple scattered round and ovoid white matter lesions - characteristic of demyelinating disease  -Continue solumedrol 1g IV QD   -Continue home medication- Tecfidera 240 mg PO BID  -Continue ASA 81 mg QD   -Neuro following and recommending 3-5 days IV solumedrol, then taper oral steroids and follow up as outpatient   -Lipid panel WNL and HBA1C =5     #Beta thalassemia anemia  -Baseline Hg 9-10  -monitor CBC    #IBS  -monitor symptoms    #DVT PPX  -SCD - pt able to ambulate

## 2018-11-30 NOTE — ED ADULT NURSE REASSESSMENT NOTE - NS ED NURSE REASSESS COMMENT FT1
pt is alert and oriented, strength is improving to left side, as per pt. pt assisted to bathroom without difficulties. will continue to monitor.

## 2018-11-30 NOTE — CONSULT NOTE ADULT - ASSESSMENT
Pt is a 37 yo F w/ PMHx of MS, IBS, migraines, and anemia presenting with left arm and leg numbness and weakness. Pt was in her usual state of health until yesterday when she started feeling dizzy which got better, but today at 4PM as she was putting up michael decorations started feeling dizzy again.  Pt reports visual blurriness, seeing spots, double vision, room spinning, and not being able to open her eyes  She went to urgent care and while she was there, she started feeling her left arm feel numb and she couldn't move Left side. She was sent to the ED and the numbness and paralysis spread to her left leg.    Most likely MS  exacerbation   R/o underlying UTI/ infectious  process  Rec MRI brain/ c spine w/ without contrast.  Continue Solumedrol 1gm Iv for 3 days  After oral steroid taper.  F/u with her PCP.  PT evaluation for Left side Sx.  Will F/u.

## 2018-12-01 ENCOUNTER — TRANSCRIPTION ENCOUNTER (OUTPATIENT)
Age: 38
End: 2018-12-01

## 2018-12-01 VITALS
HEART RATE: 58 BPM | SYSTOLIC BLOOD PRESSURE: 99 MMHG | TEMPERATURE: 98 F | DIASTOLIC BLOOD PRESSURE: 65 MMHG | RESPIRATION RATE: 18 BRPM | OXYGEN SATURATION: 100 %

## 2018-12-01 LAB
ANION GAP SERPL CALC-SCNC: 10 MMOL/L — SIGNIFICANT CHANGE UP (ref 5–17)
BUN SERPL-MCNC: 13 MG/DL — SIGNIFICANT CHANGE UP (ref 7–23)
CALCIUM SERPL-MCNC: 8.7 MG/DL — SIGNIFICANT CHANGE UP (ref 8.5–10.1)
CHLORIDE SERPL-SCNC: 109 MMOL/L — HIGH (ref 96–108)
CO2 SERPL-SCNC: 24 MMOL/L — SIGNIFICANT CHANGE UP (ref 22–31)
CREAT SERPL-MCNC: 0.51 MG/DL — SIGNIFICANT CHANGE UP (ref 0.5–1.3)
GLUCOSE SERPL-MCNC: 120 MG/DL — HIGH (ref 70–99)
HCT VFR BLD CALC: 29.3 % — LOW (ref 34.5–45)
HGB BLD-MCNC: 9.3 G/DL — LOW (ref 11.5–15.5)
MCHC RBC-ENTMCNC: 19.8 PG — LOW (ref 27–34)
MCHC RBC-ENTMCNC: 31.7 GM/DL — LOW (ref 32–36)
MCV RBC AUTO: 62.5 FL — LOW (ref 80–100)
NRBC # BLD: 0 /100 WBCS — SIGNIFICANT CHANGE UP (ref 0–0)
PLATELET # BLD AUTO: 199 K/UL — SIGNIFICANT CHANGE UP (ref 150–400)
POTASSIUM SERPL-MCNC: 3.8 MMOL/L — SIGNIFICANT CHANGE UP (ref 3.5–5.3)
POTASSIUM SERPL-SCNC: 3.8 MMOL/L — SIGNIFICANT CHANGE UP (ref 3.5–5.3)
RBC # BLD: 4.69 M/UL — SIGNIFICANT CHANGE UP (ref 3.8–5.2)
RBC # FLD: 15.7 % — HIGH (ref 10.3–14.5)
SODIUM SERPL-SCNC: 143 MMOL/L — SIGNIFICANT CHANGE UP (ref 135–145)
WBC # BLD: 11.48 K/UL — HIGH (ref 3.8–10.5)
WBC # FLD AUTO: 11.48 K/UL — HIGH (ref 3.8–10.5)

## 2018-12-01 RX ADMIN — DIMETHYL FUMARATE 240 MILLIGRAM(S): 240 CAPSULE ORAL at 09:25

## 2018-12-01 RX ADMIN — Medication 81 MILLIGRAM(S): at 11:29

## 2018-12-01 RX ADMIN — Medication 129 MILLIGRAM(S): at 05:19

## 2018-12-01 RX ADMIN — PANTOPRAZOLE SODIUM 40 MILLIGRAM(S): 20 TABLET, DELAYED RELEASE ORAL at 11:29

## 2018-12-01 NOTE — PROGRESS NOTE ADULT - REASON FOR ADMISSION
left arm and leg numbness & weakness

## 2018-12-01 NOTE — DISCHARGE NOTE ADULT - CARE PLAN
Principal Discharge DX:	MS (multiple sclerosis)  Goal:	Monitor for signs of weakness numbness and tingling  Assessment and plan of treatment:	Treated with decadron 10 mg IV and solumedrol 1 g IV x2 days  If worsening weakness, numbness or tingling , return to the Emergency Department  Follow up with your Neurologist   Secondary Diagnosis:	Beta thalassemia minor  Goal:	Monitor for signs of anemia- feeling cold, fatigue/tired  Assessment and plan of treatment:	Monitored your Hemoglobin while in hospital - Hg =9  Follow up with your primary doctor  Secondary Diagnosis:	Irritable bowel syndrome with both constipation and diarrhea  Goal:	Control symptoms and monitor for flare ups  Assessment and plan of treatment:	No issues noted during hospitalization  hydration adequately daily   Follow up with your primary doctor  Secondary Diagnosis:	Migraines  Goal:	Control migraine triggers  Assessment and plan of treatment:	No issues noted during hospitalization  Follow up with your primary doctor Principal Discharge DX:	MS (multiple sclerosis)  Goal:	Return to baseline function and Monitor for signs of weakness numbness and tingling  Assessment and plan of treatment:	Treated with decadron 10 mg IV x1 and solumedrol 1 g IV x2 days  If worsening weakness, numbness or tingling , return to the Emergency Department  Follow up with your Neurologist   Secondary Diagnosis:	Beta thalassemia minor  Goal:	Monitor for signs of anemia- feeling cold, fatigue/tired  Assessment and plan of treatment:	Monitored your Hemoglobin while in hospital - Hg =9  Follow up with your primary doctor  Secondary Diagnosis:	Irritable bowel syndrome with both constipation and diarrhea  Goal:	Control symptoms and monitor for flare ups  Assessment and plan of treatment:	No issues noted during hospitalization  hydration adequately daily   Follow up with your primary doctor  Secondary Diagnosis:	Migraines  Goal:	Control migraine triggers  Assessment and plan of treatment:	No issues noted during hospitalization  Follow up with your primary doctor Principal Discharge DX:	MS (multiple sclerosis)  Goal:	Return to baseline function and Monitor for signs of weakness numbness and tingling  Assessment and plan of treatment:	Treated with decadron 10 mg IV x1 and solumedrol 1 g IV x2 days  If worsening weakness, numbness or tingling , return to the Emergency Department  Follow up with your Neurologist Dr. Royal  Secondary Diagnosis:	Beta thalassemia minor  Goal:	Monitor for signs of anemia- feeling cold, fatigue/tired  Assessment and plan of treatment:	Monitored your Hemoglobin while in hospital - Hg =9  Follow up with your primary doctor  Secondary Diagnosis:	Irritable bowel syndrome with both constipation and diarrhea  Goal:	Control symptoms and monitor for flare ups  Assessment and plan of treatment:	No issues noted during hospitalization  hydration adequately daily   Follow up with your primary doctor  Secondary Diagnosis:	Migraines  Goal:	Control migraine triggers  Assessment and plan of treatment:	No issues noted during hospitalization  Follow up with your primary doctor

## 2018-12-01 NOTE — DISCHARGE NOTE ADULT - HOSPITAL COURSE
39 yo F w/ PMHx of MS, IBS, migraines, and anemia admitted on 11/29/18 to [facility] with left arm and leg numbness and weakness.  Pt reported dizziness, blurry vision and double vision. In the ED, she received decadron 10 mg IV and her weakness improved. Patient was later evaluated by Neurology and she was started on solumedrol 1 g IV QD. CT head was done and showed   MRI brain showed multiple scattered round and ovoid white matter lesions and Few lesions along the deep medullary penetrating veins and undersurface of corpus callosum characteristic of demyelinating disease. No enhancing lesions were identified. MR C spine showed several scattered patchy foci of T2 signal hyperintensity within the cord extending from C2 - C6 with patchy areas of enhancement consistent with acute demyelinating lesions.    Patient has resolved LLE weakness and mild LUE weakness. Numbness has much improved. Patient is medically stable and cleared for discharge from [facility] on 12/1/18. Patient to follow up with PCP and Neurologist as outpatient. Pt has already made contact with her Neurologist, who is aware of this hospitalization. Patient to complete course of steroids as prescribed.    SUBJECTIVE:   12/1/18  Pt seen and examined today. Denies worsening weakness and any other issues at this time. Numbness much improved. Pt states she was able to walk around yesterday. Pt eager to be discharged.    REVIEW OF SYSTEMS:    CONSTITUTIONAL: No weakness, fevers or chills  EYES/ENT: No visual changes;  No vertigo or throat pain   NECK: No pain or stiffness  RESPIRATORY: No cough, wheezing, hemoptysis; No shortness of breath  CARDIOVASCULAR: No chest pain or palpitations  GASTROINTESTINAL: No abdominal or epigastric pain. No nausea, vomiting, or hematemesis; No diarrhea or constipation. No melena or hematochezia.  GENITOURINARY: No dysuria, frequency or hematuria  NEUROLOGICAL: No numbness, +LUE weakness  SKIN: No itching, burning, rashes, or lesions   All other review of systems is negative unless indicated above    Vital Signs Last 24 Hrs  T(C): 36.9 (01 Dec 2018 04:43), Max: 36.9 (01 Dec 2018 04:43)  T(F): 98.4 (01 Dec 2018 04:43), Max: 98.4 (01 Dec 2018 04:43)  HR: 58 (01 Dec 2018 04:43) (58 - 70)  BP: 99/65 (01 Dec 2018 04:43) (99/65 - 125/53)  BP(mean): --  RR: 18 (01 Dec 2018 04:43) (17 - 18)  SpO2: 100% (01 Dec 2018 04:43) (98% - 100%)    PHYSICAL EXAM:    Constitutional: NAD, awake and alert, well-developed  Respiratory: Breath sounds are clear bilaterally, No wheezing, rales or rhonchi  Cardiovascular: S1 and S2, regular rate and rhythm, no Murmurs, gallops or rubs  Gastrointestinal: Bowel Sounds present, soft, nontender, nondistended, no guarding, no rebound  Extremities: No peripheral edema  Vascular: 2+ peripheral pulses  Neurological: A/O x 3,   Musculoskeletal: 5/5 strength b/l lower extremities and RUE; 3/5 strength in LUE  Skin: No rashes    MEDICATIONS:  MEDICATIONS  (STANDING):  aspirin enteric coated 81 milliGRAM(s) Oral daily  dimethyl fumarate DR Capsule 240 milliGRAM(s) Oral two times a day  influenza   Vaccine 0.5 milliLiter(s) IntraMuscular once  methylPREDNISolone sodium succinate IVPB 1000 milliGRAM(s) IV Intermittent daily  pantoprazole  Injectable 40 milliGRAM(s) IV Push daily      LABS: All Labs Reviewed:                        9.3    11.48 )-----------( 199      ( 01 Dec 2018 06:56 )             29.3     12-01    143  |  109<H>  |  13  ----------------------------<  120<H>  3.8   |  24  |  0.51    Ca    8.7      01 Dec 2018 06:56    TPro  6.3  /  Alb  3.6  /  TBili  0.6  /  DBili  x   /  AST  10<L>  /  ALT  22  /  AlkPhos  45  11-30    PT/INR - ( 29 Nov 2018 18:57 )   PT: 11.3 sec;   INR: 1.02 ratio         PTT - ( 29 Nov 2018 18:57 )  PTT:31.1 sec 37 yo F w/ PMHx of MS, IBS, migraines, and anemia admitted on 11/29/18 to [facility] with left arm and leg numbness and weakness.  Pt reported dizziness, blurry vision and double vision. In the ED, she received decadron 10 mg IV and her weakness improved. Patient was later evaluated by Neurology and she was started on solumedrol 1 g IV QD. CT head was done and showed   MRI brain showed multiple scattered round and ovoid white matter lesions and Few lesions along the deep medullary penetrating veins and undersurface of corpus callosum characteristic of demyelinating disease. No enhancing lesions were identified. MR C spine showed several scattered patchy foci of T2 signal hyperintensity within the cord extending from C2 - C6 with patchy areas of enhancement consistent with acute demyelinating lesions.    Patient has resolved LLE weakness and mild LUE weakness. Numbness has much improved. Patient is medically stable and cleared for discharge from [facility] on 12/1/18. Spoke with Dr. Daugherty, Neurologist who recommended 3-5 days of treatment prior to discharge if patient improving. Discussed with Dr. Felix, Neurologist that patient to continue PO solumedrol 60 mg then decrease to 50 mg after 3 days.  Patient to follow up with PCP and Neurologist as outpatient. Pt has already made contact with her Neurologist, who is aware of this hospitalization and patient is to follow up with Neuro on 12/3/18. Patient to complete course of steroids as prescribed with tapering.     SUBJECTIVE:   12/1/18  Pt seen and examined today. Denies worsening weakness and any other issues at this time. Numbness much improved. Pt states she was able to walk around yesterday. Pt eager to be discharged.    REVIEW OF SYSTEMS:    CONSTITUTIONAL: No weakness, fevers or chills  EYES/ENT: No visual changes;  No vertigo or throat pain   NECK: No pain or stiffness  RESPIRATORY: No cough, wheezing, hemoptysis; No shortness of breath  CARDIOVASCULAR: No chest pain or palpitations  GASTROINTESTINAL: No abdominal or epigastric pain. No nausea, vomiting, or hematemesis; No diarrhea or constipation. No melena or hematochezia.  GENITOURINARY: No dysuria, frequency or hematuria  NEUROLOGICAL: No numbness, +LUE weakness  SKIN: No itching, burning, rashes, or lesions   All other review of systems is negative unless indicated above    Vital Signs Last 24 Hrs  T(C): 36.9 (01 Dec 2018 04:43), Max: 36.9 (01 Dec 2018 04:43)  T(F): 98.4 (01 Dec 2018 04:43), Max: 98.4 (01 Dec 2018 04:43)  HR: 58 (01 Dec 2018 04:43) (58 - 70)  BP: 99/65 (01 Dec 2018 04:43) (99/65 - 125/53)  BP(mean): --  RR: 18 (01 Dec 2018 04:43) (17 - 18)  SpO2: 100% (01 Dec 2018 04:43) (98% - 100%)    PHYSICAL EXAM:    Constitutional: NAD, awake and alert, well-developed  Respiratory: Breath sounds are clear bilaterally, No wheezing, rales or rhonchi  Cardiovascular: S1 and S2, regular rate and rhythm, no Murmurs, gallops or rubs  Gastrointestinal: Bowel Sounds present, soft, nontender, nondistended, no guarding, no rebound  Extremities: No peripheral edema  Vascular: 2+ peripheral pulses  Neurological: A/O x 3,   Musculoskeletal: 5/5 strength b/l lower extremities and RUE; 3/5 strength in LUE  Skin: No rashes    MEDICATIONS:  MEDICATIONS  (STANDING):  aspirin enteric coated 81 milliGRAM(s) Oral daily  dimethyl fumarate DR Capsule 240 milliGRAM(s) Oral two times a day  influenza   Vaccine 0.5 milliLiter(s) IntraMuscular once  methylPREDNISolone sodium succinate IVPB 1000 milliGRAM(s) IV Intermittent daily  pantoprazole  Injectable 40 milliGRAM(s) IV Push daily      LABS: All Labs Reviewed:                        9.3    11.48 )-----------( 199      ( 01 Dec 2018 06:56 )             29.3     12-01    143  |  109<H>  |  13  ----------------------------<  120<H>  3.8   |  24  |  0.51    Ca    8.7      01 Dec 2018 06:56    TPro  6.3  /  Alb  3.6  /  TBili  0.6  /  DBili  x   /  AST  10<L>  /  ALT  22  /  AlkPhos  45  11-30    PT/INR - ( 29 Nov 2018 18:57 )   PT: 11.3 sec;   INR: 1.02 ratio         PTT - ( 29 Nov 2018 18:57 )  PTT:31.1 sec 39 yo F w/ PMHx of MS, IBS, migraines, and anemia admitted on 11/29/18 to [facility] with left arm and leg numbness and weakness.  Pt reported dizziness, blurry vision and double vision. In the ED, she received decadron 10 mg IV and her weakness improved. Patient was later evaluated by Neurology and she was started on solumedrol 1 g IV QD. CT head was done and showed   MRI brain showed multiple scattered round and ovoid white matter lesions and Few lesions along the deep medullary penetrating veins and undersurface of corpus callosum characteristic of demyelinating disease. No enhancing lesions were identified. MR C spine showed several scattered patchy foci of T2 signal hyperintensity within the cord extending from C2 - C6 with patchy areas of enhancement consistent with acute demyelinating lesions.    Patient has resolved LLE weakness and mild LUE weakness. Numbness has much improved. Patient is medically stable and cleared for discharge from [facility] on 12/1/18. Spoke with Dr. Daugherty, Neurologist who recommended 3-5 days of treatment prior to discharge if patient improving. Discussed with Dr. Felix, Neurologist that patient to take PO solumedrol then decrease by 10 mg after 3 days.  Patient to follow up with PCP and Neurologist as outpatient. Pt has already made contact with her Neurologist, who is aware of this hospitalization and patient is to follow up with Neuro on 12/3/18. Patient to complete course of steroids as prescribed with tapering.     SUBJECTIVE:   12/1/18  Pt seen and examined today. Denies worsening weakness and any other issues at this time. Numbness much improved. Pt states she was able to walk around yesterday. Pt eager to be discharged.    REVIEW OF SYSTEMS:    CONSTITUTIONAL: No weakness, fevers or chills  EYES/ENT: No visual changes;  No vertigo or throat pain   NECK: No pain or stiffness  RESPIRATORY: No cough, wheezing, hemoptysis; No shortness of breath  CARDIOVASCULAR: No chest pain or palpitations  GASTROINTESTINAL: No abdominal or epigastric pain. No nausea, vomiting, or hematemesis; No diarrhea or constipation. No melena or hematochezia.  GENITOURINARY: No dysuria, frequency or hematuria  NEUROLOGICAL: No numbness, +LUE weakness  SKIN: No itching, burning, rashes, or lesions   All other review of systems is negative unless indicated above    Vital Signs Last 24 Hrs  T(C): 36.9 (01 Dec 2018 04:43), Max: 36.9 (01 Dec 2018 04:43)  T(F): 98.4 (01 Dec 2018 04:43), Max: 98.4 (01 Dec 2018 04:43)  HR: 58 (01 Dec 2018 04:43) (58 - 70)  BP: 99/65 (01 Dec 2018 04:43) (99/65 - 125/53)  BP(mean): --  RR: 18 (01 Dec 2018 04:43) (17 - 18)  SpO2: 100% (01 Dec 2018 04:43) (98% - 100%)    PHYSICAL EXAM:    Constitutional: NAD, awake and alert, well-developed  Respiratory: Breath sounds are clear bilaterally, No wheezing, rales or rhonchi  Cardiovascular: S1 and S2, regular rate and rhythm, no Murmurs, gallops or rubs  Gastrointestinal: Bowel Sounds present, soft, nontender, nondistended, no guarding, no rebound  Extremities: No peripheral edema  Vascular: 2+ peripheral pulses  Neurological: A/O x 3,   Musculoskeletal: 5/5 strength b/l lower extremities and RUE; 3/5 strength in LUE  Skin: No rashes    MEDICATIONS:  MEDICATIONS  (STANDING):  aspirin enteric coated 81 milliGRAM(s) Oral daily  dimethyl fumarate DR Capsule 240 milliGRAM(s) Oral two times a day  influenza   Vaccine 0.5 milliLiter(s) IntraMuscular once  methylPREDNISolone sodium succinate IVPB 1000 milliGRAM(s) IV Intermittent daily  pantoprazole  Injectable 40 milliGRAM(s) IV Push daily      LABS: All Labs Reviewed:                        9.3    11.48 )-----------( 199      ( 01 Dec 2018 06:56 )             29.3     12-01    143  |  109<H>  |  13  ----------------------------<  120<H>  3.8   |  24  |  0.51    Ca    8.7      01 Dec 2018 06:56    TPro  6.3  /  Alb  3.6  /  TBili  0.6  /  DBili  x   /  AST  10<L>  /  ALT  22  /  AlkPhos  45  11-30    PT/INR - ( 29 Nov 2018 18:57 )   PT: 11.3 sec;   INR: 1.02 ratio         PTT - ( 29 Nov 2018 18:57 )  PTT:31.1 sec

## 2018-12-01 NOTE — DISCHARGE NOTE ADULT - OTHER SIGNIFICANT FINDINGS
CT HEAD NO CONT 11/29/18    IMPRESSION:    No acute hemorrhage.  Nonspecific subcentimeter hypodensity in the anterior right basal   ganglia. Consider MRI for further evaluation.    MR HEAD W/ WO IV CONT  11/30/18    IMPRESSION:    Multiple scattered round and ovoid white matter lesions in the   subcortical, periventricular, deep white matter distribution. Few lesions   are along the course of deep medullary penetrating veins and undersurface   of corpus callosum characteristic of demyelinating disease. No enhancing   lesions are identified.    MR CERVICAL SPINE W/WO IV CONT  11/30/18      IMPRESSION:  Several scattered patchy foci of T2 signal hyperintensity   within the cord extending from C2 through C6 with patchy areas of   enhancement consistent with acute demyelinating lesions.

## 2018-12-01 NOTE — DISCHARGE NOTE ADULT - PATIENT PORTAL LINK FT
You can access the [facility] Patient Portal, offered by [facility], by registering with the following website: [email or web address]

## 2018-12-01 NOTE — PROGRESS NOTE ADULT - SUBJECTIVE AND OBJECTIVE BOX
CHIEF COMPLAINT:  HPI  11/29/18    Pt is a 37 yo F w/ PMHx of MS, IBS, migraines, and anemia presenting with left arm and leg numbness and weakness. Pt was in her usual state of health until yesterday when she started feeling dizzy which got better, but today at 4PM as she was putting up michael decorations started feeling dizzy again. Pt reports visual blurriness, seeing spots, double vision, room spinning, and not being able to open her eyes. She felt as if her heart was pounding and she couldn't breathe. She reports feeling a chest pressure lower retrosternal, but no pain. She denies diaphoresis, nausea, and vomiting. She went to urgent care and while she was there, she started feeling her left arm feel numb and she couldn't move it. She was sent to the ED and the numbness and paralysis spread to her left leg. She denies any slurring of speech, facial asymmetry, syncope, LOC. This is similar to a previous MS flare that she had 3 years ago when she was diagnosed. At that time, it started with dizziness that continued for 2.5 days followed by left arm numbness and weakness. She has no residual deficits from her last flare. Of note for the last few days, she has been having cold sweats, but no fevers. She denies cough, wheeze, runny nose. 2 weeks ago she had abdominal pain with nausea and vomiting that resolved.     In the ED, she received decadron 10 mg and her weakness has improved. Her dizziness and sob has resolved. However now she feels pain in her left arm and leg as if someone punched them. She currently has a headache across the top of her head that is throbbing, but mild compared to her migraines.     SUBJECTIVE:   11/20/18    Pt seen and evaluated today. States she still has L sided weakness but numbness much improved. Denied headache, CP, and SOB when seen.    REVIEW OF SYSTEMS:    CONSTITUTIONAL: no weakness, no fevers or chills  EYES/ENT: No visual changes;  No vertigo or throat pain   NECK: No pain or stiffness  RESPIRATORY: No cough, wheezing, hemoptysis; No shortness of breath  CARDIOVASCULAR: No chest pain or palpitations  GASTROINTESTINAL: No abdominal or epigastric pain. No nausea, vomiting, or hematemesis; No diarrhea or constipation. No melena or hematochezia.  GENITOURINARY: No dysuria, frequency or hematuria  NEUROLOGICAL: +L side numbness and weakness  SKIN: No itching, burning, rashes, or lesions   All other review of systems is negative unless indicated above    Vital Signs Last 24 Hrs  T(C): 36.8 (30 Nov 2018 12:00), Max: 36.9 (30 Nov 2018 02:19)  T(F): 98.2 (30 Nov 2018 12:00), Max: 98.4 (30 Nov 2018 02:19)  HR: 70 (30 Nov 2018 12:00) (60 - 99)  BP: 110/70 (30 Nov 2018 12:00) (100/59 - 150/68)  BP(mean): 74 (29 Nov 2018 22:04) (74 - 74)  RR: 17 (30 Nov 2018 12:00) (12 - 22)  SpO2: 98% (30 Nov 2018 12:00) (98% - 100%)    I&O's Summary      CAPILLARY BLOOD GLUCOSE      POCT Blood Glucose.: 123 mg/dL (29 Nov 2018 18:37)      PHYSICAL EXAM:    Constitutional: NAD, awake and alert, well-developed  HEENT: EOMI,   Neck: Soft and supple, No LAD  Respiratory: Breath sounds are clear bilaterally, No wheezing, rales or rhonchi  Cardiovascular: S1 and S2, regular rate and rhythm, no Murmurs, gallops or rubs  Gastrointestinal: Bowel Sounds present, soft, nontender, nondistended, no guarding, no rebound  Extremities: No peripheral edema  Vascular: 2+ peripheral pulses  Neurological: A/O x 3, no focal deficits, sensation decreased in LUE and LLE vs RUE and RLE   Musculoskeletal: 5/5 strength in R upper and lower extremities; 3/5 strength in LUE and LLE  Skin: No rashes    MEDICATIONS:  MEDICATIONS  (STANDING):  aspirin enteric coated 81 milliGRAM(s) Oral daily  dimethyl fumarate DR Capsule 240 milliGRAM(s) Oral two times a day  influenza   Vaccine 0.5 milliLiter(s) IntraMuscular once  methylPREDNISolone sodium succinate IVPB 1000 milliGRAM(s) IV Intermittent daily  pantoprazole  Injectable 40 milliGRAM(s) IV Push daily      LABS: All Labs Reviewed:                        9.4    6.71  )-----------( 209      ( 30 Nov 2018 06:41 )             29.5     11-30    142  |  110<H>  |  11  ----------------------------<  145<H>  4.1   |  23  |  0.49<L>    Ca    8.2<L>      30 Nov 2018 06:41    TPro  6.3  /  Alb  3.6  /  TBili  0.6  /  DBili  x   /  AST  10<L>  /  ALT  22  /  AlkPhos  45  11-30    PT/INR - ( 29 Nov 2018 18:57 )   PT: 11.3 sec;   INR: 1.02 ratio         PTT - ( 29 Nov 2018 18:57 )  PTT:31.1 sec      Blood Culture:     RADIOLOGY/EKG:      EXAM:  CT BRAIN                            PROCEDURE DATE:  11/29/2018          INTERPRETATION:  CLINICAL HISTORY: Rule out intracerebral hemorrhage.    COMPARISON: None.    TECHNIQUE: Noncontrast CT of the head.    FINDINGS:  Nonspecific subtle hypodensity in anterior basal ganglia on image 9,   series 2.  There is no evidence of mass, mass effect, midline shift or extra-axial   fluid collection. The lateral ventricles and cortical sulci are   age-appropriate in size and configuration. The orbits, mastoid air cells   and visualized paranasal sinuses are unremarkable. The calvarium is   intact.    IMPRESSION:    No acute hemorrhage.  Nonspecific subcentimeter hypodensity in the anterior right basal   ganglia. Consider MRI for further evaluation.    -------------------------------------------      EXAM:  MR BRAIN WAW                             PROCEDURE DATE:  11/30/2018          INTERPRETATION:      REASON FOR EXAM:    38-year-old. An mass left arm and leg numbness and   weakness    .         TECHNIQUE:   Standardized multiplanar fat andwater weighted pulse   sequences were obtained. Examination was performed without and following    administration of 7.5 mL of intravenous Gadavist.    COMPARISON:   None.    FINDINGS:  There is normal size, configuration and morphology of the ventricles and   cortical sulci. The midline structures are intact.    There are old focal scattered round and ovoid white matter abnormality in   subcortical, periventricular and deep white matter distribution. Some of   these lesions are along the callososeptal fibers, undersurface of the   corpus callosum and along the course of the deep medullary penetrating   veins consistent with given history of multiple sclerosis. There is no   evidence of mass effect or edema. Following contrast, no definitive   enhancement is identified. Comparison prior studies if available is   suggested to evaluate for progression of disease. There is no diffusion   restriction to suggest acute infarction or other causes of cytotoxic   edema.    There is no hemorrhage, hydrocephalus, extra-axial collections or   parenchymal contusion.    There is normal configuration of the posterior fossa. The basal cisterns   are patent.    There is normal sella turcica, pituitary gland, infundibular stalk, optic   chiasm, hypothalamus, tectum and pineal region.    Following administration of intravenous contrast material, there is no   abnormal parenchymal or leptomeningeal enhancement.    The major intra-arterial flow voids are patent.           The visualized internal auditory canals and orbits are unremarkable.     The visualized paranasal sinuses are clear.    The visualized nasopharyngeal soft tissues and the upper cervical spine   are grossly intact.    IMPRESSION:    Multiple scattered round and ovoid white matter lesions in the   subcortical, periventricular, deep white matter distribution. Few lesions   are along the course of deep medullary penetrating veins and undersurface   of corpus callosum characteristic of demyelinating disease. No enhancing   lesionsare identified.      -------------------------------------------------      EXAM:  MR SPINE CERVICAL WAW IC                            PROCEDURE DATE:  11/30/2018          INTERPRETATION:      MR cervical  without gadolinium     CLINICAL INFORMATION:  HISTORY of multiple sclerosis with LEFT-sided   weakness         TECHNIQUE:   Sagittal T1-weighted images, sagittal STIR images, sagittal   T2-weighted images and axial T1 andT2-weighted gradient-echo images of   the cervical spine were obtained.         FINDINGS:   No prior similar studies are available for review.         Cervical vertebral alignment is intact.  Cervical vertebral body heights   are maintained.  Marrow signal intensity within cervical vertebral bodies   and posterior elements is unremarkable.  No osseous expansion, epidural   disease or paraspinal abnormality is found.           Cervical intervertebral discs maintain intact disc heights and signal   intensity.  No central canal or foraminal compromise is recognized.    The cervical cord demonstrates several scattered patchy foci of T2 signal   hyperintensity within the cord extending from C2 through C6 with patchy   areas of enhancement consistent with acute demyelinating lesions.  No   cord signal intensity abnormality or focal cord lesion is appreciated.    The cervical medullary junction remains intact.           IMPRESSION:  Several scattered patchy foci of T2 signal hyperintensity   within the cord extending from C2 through C6 with patchy areas of   enhancement consistent with acute demyelinating lesions.      FINDINGS discussed with Dr. Daugherty at 11:45 AM on 11/30/2018      DVT PPX: Continue SCD     ADVANCED DIRECTIVE:    DISPOSITION: D/C home once clinically improved. Pt to receive 3-5 days IV solumedrol prior to d/c
Pt has been seen and examined with FP resident, resident supervised agree with a/p       Patient is a 38y old  Female who presents with a chief complaint of left arm and leg numbness & weakness (30 Nov 2018 08:56)        PHYSICAL EXAM:  Vital Signs Last 24 Hrs  T(C): 36.8 (30 Nov 2018 12:00), Max: 36.9 (30 Nov 2018 02:19)  T(F): 98.2 (30 Nov 2018 12:00), Max: 98.4 (30 Nov 2018 02:19)  HR: 70 (30 Nov 2018 12:00) (60 - 99)  BP: 110/70 (30 Nov 2018 12:00) (100/59 - 150/68)  BP(mean): 74 (29 Nov 2018 22:04) (74 - 74)  RR: 17 (30 Nov 2018 12:00) (12 - 22)  SpO2: 98% (30 Nov 2018 12:00) (98% - 100%)  general- comfortable   -rs-aeeb,cta  -cvs-s1s2 normal   -p/a-soft,bs+  -extremity- no asymmetrical swelling noted   -cns- non focal         A/P    #ct iv steroids for acute MS exacerbation     #supportive care    #discussed with Dr. Daugherty
Pt has been seen and examined with FP resident, resident supervised agree with a/p       Patient is a 38y old  Female who presents with a chief complaint of left arm and leg numbness & weakness (30 Nov 2018 08:56)        PHYSICAL EXAM:    general- comfortable, feel much better and stronger   -rs-aeeb,cta  -cvs-s1s2 normal   -p/a-soft,bs+  -extremity- no asymmetrical swelling noted   -cns- non focal         A/P    # d/c today after discussion with neurologist     #further management as an outpt     #time spent 60 minutes

## 2018-12-01 NOTE — DISCHARGE NOTE ADULT - CARE PROVIDER_API CALL
Dr. Royal,   Neurology  Phone: (   )    -  Fax: (   )    -    Dr. Azul, [facility],   Phone: (   )    -  Fax: (   )    - Dr. Royal,   Neurology  Phone: (   )    -  Fax: (   )    -    Dr. Azul, [facility],   Phone: (   )    -  Fax: (   )    -

## 2018-12-01 NOTE — DISCHARGE NOTE ADULT - ADDITIONAL INSTRUCTIONS
If fevers >100.4F, chills, chest pain, shortness of breath, light headedness, dizziness, fainting, numbness , weakness or tingling occur, return to the Emergency Department.   Follow up with your Neurologist and primary care doctor outpatient.  Take all medications as prescribed. If fevers >100.4F, chills, chest pain, shortness of breath, light headedness, dizziness, fainting, numbness , weakness or tingling occur, return to the Emergency Department.   Follow up with your Neurologist and primary care doctor outpatient. Appointment with Neurologist scheduled for 12/3/18.  Take all medications as prescribed. Steroids to be taken and tapered.   **Spoke with Dr. Daugherty, Neurologist who recommended 3-5 days of treatment prior to discharge if patient improving. Discussed with Dr. Felix, Neurologist that patient to continue PO solumedrol 60 mg then decrease to 50 mg after 3 days.  **  Follow up with Neurology.  To get Radiology results and obtain disc call [phone] Radiology department on 12/3/18. If fevers >100.4F, chills, chest pain, shortness of breath, light headedness, dizziness, fainting, numbness , weakness or tingling occur, return to the Emergency Department.   Follow up with your Neurologist and primary care doctor outpatient. Appointment with Neurologist scheduled for 12/3/18.  Take all medications as prescribed. Steroids to be taken and tapered.   **Spoke with Dr. Daugherty, Neurologist who recommended 3-5 days of treatment prior to discharge if patient improving. Discussed with Dr. Felix, Neurologist that patient to take PO solumedrol then decrease by 10 mg after 3 days.  **  Follow up with Neurology. Appt on 12/3/18.   To get Radiology results and obtain disc call [phone] Radiology department on 12/3/18.

## 2018-12-01 NOTE — DISCHARGE NOTE ADULT - MEDICATION SUMMARY - MEDICATIONS TO TAKE
I will START or STAY ON the medications listed below when I get home from the hospital:    tumeric  -- Indication: For Alternative medicine    methylPREDNISolone 32 mg oral tablet  -- 2 tab(s) by mouth once a day   -- It is very important that you take or use this exactly as directed.  Do not skip doses or discontinue unless directed by your doctor.  Obtain medical advice before taking any non-prescription drugs as some may affect the action of this medication.  Take with food or milk.    -- Indication: For MULTIPLE SCLEROSIS    aspirin 81 mg oral tablet  -- 1 tab(s) by mouth once a day  -- Indication: For to prevent and treat flushing asociated with tecfidera    Tecfidera 240 mg oral delayed release capsule  -- 1 cap(s) by mouth 2 times a day  -- Indication: For MULTIPLE SCLEROSIS

## 2018-12-01 NOTE — DISCHARGE NOTE ADULT - PROVIDER TOKENS
FREE:[LAST:[Dr. Royal],PHONE:[(   )    -],FAX:[(   )    -],ADDRESS:[Neurology]],FREE:[LAST:[Dr. Azul, [facility]],PHONE:[(   )    -],FAX:[(   )    -]]

## 2018-12-02 LAB
CULTURE RESULTS: SIGNIFICANT CHANGE UP
SPECIMEN SOURCE: SIGNIFICANT CHANGE UP

## 2018-12-04 DIAGNOSIS — K58.9 IRRITABLE BOWEL SYNDROME WITHOUT DIARRHEA: ICD-10-CM

## 2018-12-04 DIAGNOSIS — Z79.899 OTHER LONG TERM (CURRENT) DRUG THERAPY: ICD-10-CM

## 2018-12-04 DIAGNOSIS — D63.8 ANEMIA IN OTHER CHRONIC DISEASES CLASSIFIED ELSEWHERE: ICD-10-CM

## 2018-12-04 DIAGNOSIS — Z79.82 LONG TERM (CURRENT) USE OF ASPIRIN: ICD-10-CM

## 2018-12-04 DIAGNOSIS — G35 MULTIPLE SCLEROSIS: ICD-10-CM

## 2018-12-04 DIAGNOSIS — Z88.8 ALLERGY STATUS TO OTHER DRUGS, MEDICAMENTS AND BIOLOGICAL SUBSTANCES STATUS: ICD-10-CM

## 2018-12-04 DIAGNOSIS — D56.1 BETA THALASSEMIA: ICD-10-CM

## 2018-12-09 NOTE — DISCHARGE NOTE ADULT - PLAN OF CARE
Telephone Encounter by Ruthie Kaur MD at 02/10/17 02:34 PM     Author:  Ruthie Kaur MD Service:  (none) Author Type:  Physician     Filed:  02/10/17 02:34 PM Encounter Date:  2/10/2017 Status:  Signed     :  Ruthie Kaur MD (Physician)            CAN CUT PILL IN HALF TILL HER APPT[TB1.1M]      Revision History        User Key Date/Time User Provider Type Action    > TB1.1 02/10/17 02:34 PM Ruthie Kaur MD Physician Sign    M - Manual             Monitor for signs of weakness numbness and tingling Treated with decadron 10 mg IV and solumedrol 1 g IV x2 days  If worsening weakness, numbness or tingling , return to the Emergency Department  Follow up with your Neurologist  Monitor for signs of anemia- feeling cold, fatigue/tired Monitored your Hemoglobin while in hospital - Hg =9  Follow up with your primary doctor Control symptoms and monitor for flare ups No issues noted during hospitalization  hydration adequately daily   Follow up with your primary doctor Control migraine triggers No issues noted during hospitalization  Follow up with your primary doctor Return to baseline function and Monitor for signs of weakness numbness and tingling Treated with decadron 10 mg IV x1 and solumedrol 1 g IV x2 days  If worsening weakness, numbness or tingling , return to the Emergency Department  Follow up with your Neurologist  Treated with decadron 10 mg IV x1 and solumedrol 1 g IV x2 days  If worsening weakness, numbness or tingling , return to the Emergency Department  Follow up with your Neurologist Dr. Royal

## 2019-04-02 RX ORDER — DIMETHYL FUMARATE 240 MG/1
1 CAPSULE ORAL
Qty: 0 | Refills: 0 | COMMUNITY

## 2019-04-02 RX ORDER — ASPIRIN/CALCIUM CARB/MAGNESIUM 324 MG
1 TABLET ORAL
Qty: 0 | Refills: 0 | COMMUNITY

## 2019-07-22 ENCOUNTER — TRANSCRIPTION ENCOUNTER (OUTPATIENT)
Age: 39
End: 2019-07-22

## 2021-12-04 ENCOUNTER — TRANSCRIPTION ENCOUNTER (OUTPATIENT)
Age: 41
End: 2021-12-04

## 2022-07-11 ENCOUNTER — EMERGENCY (EMERGENCY)
Facility: HOSPITAL | Age: 42
LOS: 0 days | Discharge: HOME | End: 2022-07-11
Attending: EMERGENCY MEDICINE
Payer: MEDICARE

## 2022-07-11 VITALS
HEART RATE: 60 BPM | OXYGEN SATURATION: 100 % | SYSTOLIC BLOOD PRESSURE: 133 MMHG | TEMPERATURE: 98 F | DIASTOLIC BLOOD PRESSURE: 65 MMHG | RESPIRATION RATE: 18 BRPM

## 2022-07-11 VITALS — HEIGHT: 66 IN | WEIGHT: 154.98 LBS

## 2022-07-11 DIAGNOSIS — K58.0 IRRITABLE BOWEL SYNDROME WITH DIARRHEA: ICD-10-CM

## 2022-07-11 DIAGNOSIS — D56.3 THALASSEMIA MINOR: ICD-10-CM

## 2022-07-11 DIAGNOSIS — Z98.890 OTHER SPECIFIED POSTPROCEDURAL STATES: Chronic | ICD-10-CM

## 2022-07-11 DIAGNOSIS — G35 MULTIPLE SCLEROSIS: ICD-10-CM

## 2022-07-11 DIAGNOSIS — M54.50 LOW BACK PAIN, UNSPECIFIED: ICD-10-CM

## 2022-07-11 DIAGNOSIS — Z98.891 HISTORY OF UTERINE SCAR FROM PREVIOUS SURGERY: Chronic | ICD-10-CM

## 2022-07-11 DIAGNOSIS — K58.1 IRRITABLE BOWEL SYNDROME WITH CONSTIPATION: ICD-10-CM

## 2022-07-11 DIAGNOSIS — G43.909 MIGRAINE, UNSPECIFIED, NOT INTRACTABLE, WITHOUT STATUS MIGRAINOSUS: ICD-10-CM

## 2022-07-11 DIAGNOSIS — Z88.8 ALLERGY STATUS TO OTHER DRUGS, MEDICAMENTS AND BIOLOGICAL SUBSTANCES STATUS: ICD-10-CM

## 2022-07-11 DIAGNOSIS — Z79.82 LONG TERM (CURRENT) USE OF ASPIRIN: ICD-10-CM

## 2022-07-11 DIAGNOSIS — Z96.7 PRESENCE OF OTHER BONE AND TENDON IMPLANTS: Chronic | ICD-10-CM

## 2022-07-11 DIAGNOSIS — Z87.442 PERSONAL HISTORY OF URINARY CALCULI: ICD-10-CM

## 2022-07-11 DIAGNOSIS — Z98.890 OTHER SPECIFIED POSTPROCEDURAL STATES: ICD-10-CM
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
## 2022-07-11 PROCEDURE — 76376 3D RENDER W/INTRP POSTPROCES: CPT

## 2022-07-11 PROCEDURE — 96372 THER/PROPH/DIAG INJ SC/IM: CPT

## 2022-07-11 PROCEDURE — 72192 CT PELVIS W/O DYE: CPT | Mod: MA

## 2022-07-11 PROCEDURE — 99284 EMERGENCY DEPT VISIT MOD MDM: CPT | Mod: FS

## 2022-07-11 PROCEDURE — 99284 EMERGENCY DEPT VISIT MOD MDM: CPT | Mod: 25

## 2022-07-11 PROCEDURE — 72192 CT PELVIS W/O DYE: CPT | Mod: 26,MA

## 2022-07-11 PROCEDURE — 76376 3D RENDER W/INTRP POSTPROCES: CPT | Mod: 26

## 2022-07-11 RX ORDER — KETOROLAC TROMETHAMINE 30 MG/ML
1 SYRINGE (ML) INJECTION
Qty: 9 | Refills: 0
Start: 2022-07-11 | End: 2022-07-13

## 2022-07-11 RX ORDER — KETOROLAC TROMETHAMINE 30 MG/ML
15 SYRINGE (ML) INJECTION ONCE
Refills: 0 | Status: DISCONTINUED | OUTPATIENT
Start: 2022-07-11 | End: 2022-07-11

## 2022-07-11 RX ADMIN — Medication 15 MILLIGRAM(S): at 21:12

## 2022-07-11 NOTE — ED STATDOCS - PATIENT PORTAL LINK FT
You can access the FollowMyHealth Patient Portal offered by [facility] by registering at the following website: [email or web address]. By joining [employer]’s FollowMyHealth portal, you will also be able to view your health information using other applications (apps) compatible with our system.

## 2022-07-11 NOTE — ED STATDOCS - NSFOLLOWUPINSTRUCTIONS_ED_ALL_ED_FT
English[place]                                                                        Acute Pain, Adult      Acute pain is a type of sudden pain that may last for just a few days or for as long as six months. It is often related to an illness, injury, or medical procedure. Acute pain may be mild, moderate, or severe.    Pain can make it hard for you to do your normal, daily activities. It can cause anxiety and lead to other problems if it is left untreated. Treatment depends on the cause and severity of your pain. Acute pain usually goes away once your injury has healed or you are no longer ill.      Follow these instructions at home:      Medicines      •Take over-the-counter and prescription medicines only as told by your health care provider.      •Take the lowest dose of medicine for the shortest amount of time needed to relieve the pain.    •If you are taking prescription pain medicine:  •Do not stop taking the medicine suddenly. Talk to your health care provider about how and when to discontinue prescription medicine.      •Do not take more pills than told by your health care provider even if your pain is severe.      •Do not take other over-the-counter pain medicines in addition to prescription pain medicine unless told by your health care provider.      •Ask your health care provider if the medicine requires you to avoid driving or using heavy machinery.    •Ask your health care provider if the medicine can cause constipation. You may need to take these actions to prevent or treat constipation:  •Drink enough fluid to keep your urine pale yellow.      •Eat foods that are high in fiber, such as beans, whole grains, and fresh fruits and vegetables.      •Take over-the-counter or prescription medicines.      •Limit foods that are high in fat and processed sugars, such as fried or sweet foods.            Managing pain, stiffness, and swelling                   If directed, put ice on the affected area. To do this:  •Put ice in a plastic bag.      •Place a towel between your skin and the bag.      •Leave the ice on for 20 minutes, 2–3 times a day.      If directed, apply heat to the affected area as often as told by your health care provider. Use the heat source that your health care provider recommends, such as a moist heat pack or a heating pad.  •Place a towel between your skin and the heat source.      •Leave the heat on for 20–30 minutes.      •Remove the heat if your skin turns bright red. This is especially important if you are unable to feel pain, heat, or cold. You may have a greater risk of getting burned.      Activity     •Rest as told by your health care provider.      •Return to your normal activities as told by your health care provider. Ask your health care provider what activities are safe for you.      General instructions     •Check your pain level as told by your health care provider.      •Ask your health care provider if other strategies such as distraction, relaxation, or physical therapies can help your pain.      •Keep all follow-up visits as told by your health care provider. This is important.        Contact a health care provider if:    •Your pain is not controlled by medicine.      •Your pain does not improve or gets worse.      •You have side effects from pain medicines, such as vomiting or confusion.        Get help right away if you:    •Have severe pain.      •Have trouble breathing.      •Lose consciousness.    •Have chest pain or pressure that lasts for more than a few minutes, or if you have other symptoms along with chest pain, including if you:  •Have pain or discomfort in one or both arms, your back, neck, jaw, or stomach.      •Have shortness of breath.      •Break out in a cold sweat.      •Feel nauseous.      •Become light-headed.        These symptoms may represent a serious problem that is an emergency. Do not wait to see if the symptoms will go away. Get medical help right away. Call your local emergency services (911 in the [place] Do not drive yourself to the hospital.       Summary    •Acute pain may be mild, moderate, or severe. It usually goes away once your injury has healed or you are no longer ill.      •Take over-the-counter and prescription medicines only as told by your health care provider.      •Ask your health care provider if the medicine prescribed to you can cause constipation.      •Contact a health care provider if your pain is not controlled by medicine.      This information is not intended to replace advice given to you by your health care provider. Make sure you discuss any questions you have with your health care provider.      Document Revised: 05/04/2020 Document Reviewed: 05/04/2020    [employer] Patient Education © 2022 [employer]          take the toradol  ice to the area for 24 hours then warm moist heat  use donut cushion

## 2022-07-11 NOTE — ED STATDOCS - NSICDXPASTMEDICALHX_GEN_ALL_CORE_FT
PAST MEDICAL HISTORY:  Anemia in other chronic diseases classified elsewhere beta-thal minor    Beta thalassemia minor     Irritable bowel syndrome with both constipation and diarrhea     Kidney stones     Migraines     MS (multiple sclerosis)

## 2022-07-11 NOTE — ED STATDOCS - OBJECTIVE STATEMENT
43 y/o female with a PMHx of MS, migraines, IBS, kidney stones presents to the ED with lower back/tail bone pain s/p falling down the steps. Pt notes she fell so hard she had to urinate. Pt notes her feet feel "heavy." No LOC.

## 2022-07-11 NOTE — ED STATDOCS - NSICDXFAMILYHX_GEN_ALL_CORE_FT
FAMILY HISTORY:  Grandparent  Still living? Unknown  Family history of bladder cancer, Age at diagnosis: Age Unknown  Family history of colon cancer, Age at diagnosis: Age Unknown

## 2022-07-11 NOTE — ED STATDOCS - PROGRESS NOTE DETAILS
pt seen with ER attending s/p fall down steps co lower back buttock pain able to walk discussed results and plan with the patient

## 2022-07-11 NOTE — ED ADULT TRIAGE NOTE - CHIEF COMPLAINT QUOTE
pt presents to ED due to falling down 6 stairs falling on coccyx bone with severe pain pt denies hitting her head - LOC

## 2022-07-12 PROBLEM — G43.909 MIGRAINE, UNSPECIFIED, NOT INTRACTABLE, WITHOUT STATUS MIGRAINOSUS: Chronic | Status: ACTIVE | Noted: 2018-11-30

## 2022-07-12 PROBLEM — N20.0 CALCULUS OF KIDNEY: Chronic | Status: ACTIVE | Noted: 2018-11-30

## 2022-07-12 PROBLEM — D56.3 THALASSEMIA MINOR: Chronic | Status: ACTIVE | Noted: 2018-11-30

## 2022-07-12 PROBLEM — G35 MULTIPLE SCLEROSIS: Chronic | Status: ACTIVE | Noted: 2018-11-29

## 2022-07-12 PROBLEM — K58.2 MIXED IRRITABLE BOWEL SYNDROME: Chronic | Status: ACTIVE | Noted: 2018-11-30

## 2022-07-12 PROBLEM — D63.8 ANEMIA IN OTHER CHRONIC DISEASES CLASSIFIED ELSEWHERE: Chronic | Status: ACTIVE | Noted: 2018-11-30

## 2023-09-19 NOTE — PATIENT PROFILE ADULT - ARE SIGNIFICANT INDICATORS COMPLETE.
1st attempt-Left message for patient to call back at [phone] to schedule below procedure.    Yes

## 2024-06-07 NOTE — ED STATDOCS - CROS ED ENMT ALL NEG
Quality 431: Preventive Care And Screening: Unhealthy Alcohol Use - Screening: Patient not identified as an unhealthy alcohol user when screened for unhealthy alcohol use using a systematic screening method Quality 226: Preventive Care And Screening: Tobacco Use: Screening And Cessation Intervention: Patient screened for tobacco use and is an ex/non-smoker Detail Level: Detailed negative...